# Patient Record
(demographics unavailable — no encounter records)

---

## 2025-05-15 NOTE — CARDIOLOGY SUMMARY
[de-identified] : 5/15/25  sinus rhythm left axis , LAFB , ICRBBB  [de-identified] : 5/31/23 7 METS  92 % MPHR No ST changes  [de-identified] : 5/31/23 TDS MIld LVH  normal EF 60-65 % , mild DD , normal RVEF  Mild dilated proximal ascending aorta   MAC posterior

## 2025-05-15 NOTE — PHYSICAL EXAM
[No Acute Distress] : no acute distress [Obese] : obese [Normal Conjunctiva] : normal conjunctiva [Normal Venous Pressure] : normal venous pressure [Normal Rate] : normal [Normal S1] : normal S1 [Normal S2] : normal S2 [No Murmur] : no murmurs heard [No Pitting Edema] : no pitting edema present [2+] : left 2+ [No Abnormalities] : the abdominal aorta was not enlarged and no bruit was heard [Soft] : abdomen soft [Non Tender] : non-tender [Normal Bowel Sounds] : normal bowel sounds [Normal Gait] : normal gait [Normal] : alert and oriented, normal memory [S3] : no S3 [S4] : no S4 [Right Carotid Bruit] : no bruit heard over the right carotid [Left Carotid Bruit] : no bruit heard over the left carotid [Right Femoral Bruit] : no bruit heard over the right femoral artery [Left Femoral Bruit] : no bruit heard over the left femoral artery

## 2025-05-15 NOTE — HISTORY OF PRESENT ILLNESS
[FreeTextEntry1] : 54  year old male with obesity , HTN DM chronic smoker  came for follow up  with complain that his primary told him that he had abnormal ekg , Patient complain he does have intermittent shortness of breath when he rushes , without chest pain , no palpitation ,  was smoker 1 PPD for 25 years , patient blood pressure is controlled , his blood sugar is improving  , his weight has increased ,  Patient acid reflex is under control on medication    blood work showed  LDL 79 HDL 31  glucose 160  Hb A1c 7.0,  CBC showed elevated hemoglobin and hematocrit , low platelet count 120, Patient does give hx of snoring ,  he does smoke regularly   Patient blood pressure is well controlled , taking

## 2025-05-15 NOTE — ASSESSMENT
[FreeTextEntry1] : Patient with above hx  who had sedentary life style , obesity  with   PEREZ : multifactorial  possibly due to smoking , obesity , hypertensive heart disease , rule out CAD  recommend echo ,  CT coronaries ,   abnormal EKG : non specific  without active chest pain : likely from hypertensive heart disease ,  MIld LVH mild dilated ascending aorta , normal EKG stress test , continue to  monitor , with strict blood pressure  HTN :  hypertensive heart disease , controlled , continue low salt diet and medication , losartan 100 mg po daily   DM : controlled   recent HB A1c 7 ,encourage patient to follow diet restriction , continue medication ,   Obesity :  encourage patient to follow life style modification , weight loss ,  recommended to have sleep study to rule out sleep apnea , as patient has hx snoring   dyslipidemia :/ will obtain blood work , cardiac calcium score  to risk stratify ,     Smoker : 30 Pack years , encouraged the patient to quit smoking ,  explained about about long term consequences of continuation of smoking ,   elevated hemoglobin , low platelet count : recommend hematology , advised to  quit smoking , ? hematology evaluation , home sleep study   GERD : controlled on omeprazole 20 mg po daily

## 2025-05-15 NOTE — REVIEW OF SYSTEMS
[Snoring] : snoring [Heartburn] : heartburn [Fever] : no fever [Chills] : no chills [Blurry Vision] : no blurred vision [Earache] : no earache [SOB] : no shortness of breath [Dyspnea on exertion] : not dyspnea during exertion [Chest Discomfort] : no chest discomfort [Lower Ext Edema] : no extremity edema [Leg Claudication] : no intermittent leg claudication [Palpitations] : no palpitations [Orthopnea] : no orthopnea [PND] : no PND [Wheezing] : no wheezing [Coughing Up Blood] : no hemoptysis [Abdominal Pain] : no abdominal pain [Nausea] : no nausea [Vomiting] : no vomiting [Change in Appetite] : no change in appetite [Change In The Stool] : no change in stool [Dysphagia] : no dysphagia [Constipation] : no constipation [Joint Pain] : no joint pain [Rash] : no rash [Dizziness] : no dizziness [Convulsions] : no convulsions [Confusion] : no confusion was observed [Easy Bleeding] : no tendency for easy bleeding

## 2025-06-27 NOTE — CARDIOLOGY SUMMARY
[de-identified] : 6/27/25  sinus rhythm left axis , LAFB , ICRBBB  [de-identified] : 5/31/23 7 METS  92 % MPHR No ST changes  [de-identified] : 5/31/23 TDS MIld LVH  normal EF 60-65 % , mild DD , normal RVEF  Mild dilated proximal ascending aorta   MAC posterior  [de-identified] : 6/19/25 calcium score 3866   LM nl ,  RI mild disease ,LAD  long segment calcified /non calcified PRox,Mid with severe luminal narrowin , LCX wih severe disease  RCA moderate disease

## 2025-06-27 NOTE — ASSESSMENT
[FreeTextEntry1] : Patient with above hx  who had sedentary life style , obesity  with    PEREZ : multifactorial  possibly due to smoking , obesity , hypertensive heart disease , patient has significant Disease LAD LCX , with  markedly elevated calcium score  recommend cardiac cath , explained to patient to in details , will arrange in [facility]   abnormal EKG : non specific  without active chest pain : likely from hypertensive heart disease ,  Mild LVH mild dilated ascending aorta ,  continue to  monitor , with strict blood pressure  HTN :  hypertensive heart disease , controlled , continue low salt diet and medication , losartan 100 mg po daily   DM : controlled   recent HB A1c 7 ,encourage patient to follow diet restriction , continue medication ,   Obesity :  encourage patient to follow life style modification , weight loss ,  recommended to have sleep study to rule out sleep apnea , as patient has hx snoring  however it is expensive he does not want to pursue it at this time   dyslipidemia :/ low HDl 31 LDL 79   , markedly elevated calcium score  with severe LAD LCX disease   Smoker : 30 Pack years , encouraged the patient to quit smoking ,  explained about about long term consequences of continuation of smoking ,   elevated hemoglobin , low platelet count : recommend hematology , advised to  quit smoking , ? hematology evaluation , home sleep study  which he refuse to have it   GERD : controlled on omeprazole 20 mg po daily

## 2025-06-27 NOTE — HISTORY OF PRESENT ILLNESS
[FreeTextEntry1] : 54  year old male with obesity , HTN DM chronic smoker ,abnormal ekg , Patient complain he does have intermittent shortness of breath when he rushes , without chest pain , no palpitation ,  was smoker 1 PPD for 25 years , patient blood pressure is controlled , his blood sugar is improving  , his weight has increased ,  Patient acid reflex is under control on medication , patient had calcium score 3866   LM nl ,  RI mild disease ,LAD  long segment calcified /non calcified PRox,Mid with severe luminal narrowing , LCX wih severe disease  RCA moderate disease , patient says he does have epigastric tightness when he walks , no sob with it ,    blood work showed  LDL 79 HDL 31  glucose 160  Hb A1c 7.0,  CBC showed elevated hemoglobin and hematocrit , low platelet count 120, Patient does give hx of snoring ,  he does smoke regularly   Patient blood pressure is well controlled , taking   Patient did not have sleep study as it is very expensive    This visit was conducted as part of ongoing, longitudinal medical care for patient's chronic medical diagnoses and other issues.

## 2025-07-18 NOTE — ASSESSMENT
[FreeTextEntry1] : Patient with above hx  who had sedentary life style , obesity  with    PEREZ : multifactorial  possibly due to smoking , obesity , hypertensive heart disease , patient has significant Disease LAD LCX , with  markedly elevated calcium score  had cardiac cath showed significant calcified prox to mid LAD disease   option of surgery and PCI , discussed , patient is reluctant to have surgery , he prefers PCI , involves Rot ablation and stent . will speak to Dr marin , will rosuvastatin 5 mg po daily , blood work   abnormal EKG : non specific  without active chest pain : likely from hypertensive heart disease ,  Mild LVH mild dilated ascending aorta ,  continue to  monitor , with strict blood pressure  HTN :  hypertensive heart disease , controlled , continue low salt diet and medication , losartan 100 mg po daily   DM : controlled   recent HB A1c 7 ,encourage patient to follow diet restriction , continue medication ,   Obesity :  encourage patient to follow life style modification , weight loss ,  recommended to have sleep study to rule out sleep apnea , as patient has hx snoring  however it is expensive he does not want to pursue it at this time as it cost 1500$   dyslipidemia :/ low HDL 31 LDL 79   , markedly elevated calcium score  with severe LAD LCX disease   Smoker : 30 Pack years , encouraged the patient to quit smoking ,  explained about about long term consequences of continuation of smoking ,   elevated hemoglobin, low platelet count : recommend hematology , advised to  quit smoking , ? hematology evaluation , home sleep study  which he refuse to have it   GERD : controlled on omeprazole 20 mg po daily

## 2025-07-18 NOTE — REVIEW OF SYSTEMS
[Fever] : no fever [Chills] : no chills [Blurry Vision] : no blurred vision [Earache] : no earache [SOB] : no shortness of breath [Dyspnea on exertion] : not dyspnea during exertion [Chest Discomfort] : no chest discomfort [Lower Ext Edema] : no extremity edema [Leg Claudication] : no intermittent leg claudication [Palpitations] : no palpitations [Orthopnea] : no orthopnea [PND] : no PND [Wheezing] : no wheezing [Coughing Up Blood] : no hemoptysis [Snoring] : snoring [Abdominal Pain] : no abdominal pain [Nausea] : no nausea [Vomiting] : no vomiting [Heartburn] : heartburn [Change in Appetite] : no change in appetite [Change In The Stool] : no change in stool [Dysphagia] : no dysphagia [Constipation] : no constipation [Joint Pain] : no joint pain [Rash] : no rash [Dizziness] : no dizziness [Convulsions] : no convulsions [Confusion] : no confusion was observed [Easy Bleeding] : no tendency for easy bleeding

## 2025-07-18 NOTE — CARDIOLOGY SUMMARY
[de-identified] : 6/27/25  sinus rhythm left axis , LAFB , ICRBBB  [de-identified] : 5/31/23 7 METS  92 % MPHR No ST changes  [de-identified] : 5/31/23 TDS MIld LVH  normal EF 60-65 % , mild DD , normal RVEF  Mild dilated proximal ascending aorta   MAC posterior  [de-identified] : 6/19/25 calcium score 3866   LM nl ,  RI mild disease ,LAD  long segment calcified /non calcified PRox,Mid with severe luminal narrowin , LCX wih severe disease  RCA moderate disease  [de-identified] : 7/16/25 cath  LM 20% LAD  prox to mid  70% calcified  IFR 0.83  LCX diffuse mid LCX 60%  IFR .90   RCA 50%Mid Rpda    recommended  robotic LIMA  vs PCI ,

## 2025-07-18 NOTE — PHYSICAL EXAM
[No Acute Distress] : no acute distress [Obese] : obese [Normal Conjunctiva] : normal conjunctiva [Normal Venous Pressure] : normal venous pressure [Normal Rate] : normal [Normal S1] : normal S1 [Normal S2] : normal S2 [S3] : no S3 [S4] : no S4 [No Murmur] : no murmurs heard [No Pitting Edema] : no pitting edema present [Right Carotid Bruit] : no bruit heard over the right carotid [Left Carotid Bruit] : no bruit heard over the left carotid [Right Femoral Bruit] : no bruit heard over the right femoral artery [Left Femoral Bruit] : no bruit heard over the left femoral artery [2+] : left 2+ [No Abnormalities] : the abdominal aorta was not enlarged and no bruit was heard [Soft] : abdomen soft [Non Tender] : non-tender [Normal Bowel Sounds] : normal bowel sounds [Normal Gait] : normal gait [Normal] : alert and oriented, normal memory

## 2025-07-18 NOTE — HISTORY OF PRESENT ILLNESS
[FreeTextEntry1] : 54  year old male with obesity , HTN DM chronic smoker ,abnormal ekg , came for follow up after having cardiac cath showed    Patient complain he does have intermittent shortness of breath when he rushes , without chest pain , no palpitation ,  was smoker 1 PPD for 25 years , patient blood pressure is controlled , his blood sugar is improving  , his weight has increased ,  Patient acid reflex is under control on medication , patient had calcium score 3866   LM nl ,  RI mild disease ,LAD  long segment calcified /non calcified PRox,Mid with severe luminal narrowing , LCX wih severe disease  RCA moderate disease , patient says he does have epigastric tightness when he walks , no sob with it ,    blood work showed  LDL 79 HDL 31  glucose 160  Hb A1c 7.0,  CBC showed elevated hemoglobin and hematocrit , low platelet count 120, Patient does give hx of snoring ,  he does smoke regularly   Patient blood pressure is well controlled , taking   Patient did not have sleep study as it is very expensive    This visit was conducted as part of ongoing, longitudinal medical care for patient's chronic medical diagnoses and other issues.